# Patient Record
Sex: FEMALE | Race: WHITE | NOT HISPANIC OR LATINO
[De-identification: names, ages, dates, MRNs, and addresses within clinical notes are randomized per-mention and may not be internally consistent; named-entity substitution may affect disease eponyms.]

---

## 2019-05-04 ENCOUNTER — TRANSCRIPTION ENCOUNTER (OUTPATIENT)
Age: 24
End: 2019-05-04

## 2019-12-27 ENCOUNTER — TRANSCRIPTION ENCOUNTER (OUTPATIENT)
Age: 24
End: 2019-12-27

## 2021-04-17 ENCOUNTER — TRANSCRIPTION ENCOUNTER (OUTPATIENT)
Age: 26
End: 2021-04-17

## 2021-07-20 ENCOUNTER — TRANSCRIPTION ENCOUNTER (OUTPATIENT)
Age: 26
End: 2021-07-20

## 2021-07-26 ENCOUNTER — TRANSCRIPTION ENCOUNTER (OUTPATIENT)
Age: 26
End: 2021-07-26

## 2021-09-22 ENCOUNTER — TRANSCRIPTION ENCOUNTER (OUTPATIENT)
Age: 26
End: 2021-09-22

## 2023-01-08 ENCOUNTER — NON-APPOINTMENT (OUTPATIENT)
Age: 28
End: 2023-01-08

## 2023-01-09 PROBLEM — Z00.00 ENCOUNTER FOR PREVENTIVE HEALTH EXAMINATION: Status: ACTIVE | Noted: 2023-01-09

## 2023-01-09 PROBLEM — Z00.00 ENCOUNTER FOR PREVENTIVE HEALTH EXAMINATION: Noted: 2023-01-09

## 2023-01-11 ENCOUNTER — NON-APPOINTMENT (OUTPATIENT)
Age: 28
End: 2023-01-11

## 2023-01-11 ENCOUNTER — APPOINTMENT (OUTPATIENT)
Dept: ORTHOPEDIC SURGERY | Facility: CLINIC | Age: 28
End: 2023-01-11
Payer: COMMERCIAL

## 2023-01-11 DIAGNOSIS — S89.91XA UNSPECIFIED INJURY OF RIGHT LOWER LEG, INITIAL ENCOUNTER: ICD-10-CM

## 2023-01-11 PROCEDURE — 99204 OFFICE O/P NEW MOD 45 MIN: CPT

## 2023-01-11 PROCEDURE — 73564 X-RAY EXAM KNEE 4 OR MORE: CPT | Mod: RT

## 2023-01-13 PROBLEM — S89.91XA RIGHT KNEE INJURY: Status: ACTIVE | Noted: 2023-01-13

## 2023-01-13 NOTE — PHYSICAL EXAM
[de-identified] : Oriented to time, place, person\par Mood: Normal\par Affect: Normal\par Appearance: Healthy, well appearing, no acute distress.\par Gait: Antalgic\par Assistive Devices: knee immobilizer\par \par Right Knee Exam:\par \par Skin: Clean, dry, intact\par Inspection: No obvious malalignment, no masses, + swelling, + effusion\par Pulses: 2+ DP/PT pulses \par ROM: 0-70 degrees of flexion. No pain with deep knee flexion/extension.\par Tenderness: + MJLT. No LJLT. No pain over the patella facets. No pain to the quadriceps tendon. No pain to the patella tendon. No posterior knee tenderness.\par Stability: Stable to varus, Grade I valgus. IIB Lachman testing. Negative anterior drawer, negative posterior drawer.\par Strength: 5/5 Q/H/TA/GS/EHL, without atrophy\par Neuro: Intact to light touch throughout, DTRs normal\par Additional Tests: Negative Pavan's test, Negative patellar grind test  [de-identified] : The following radiographs were ordered and read by me during this patients visit. I reviewed each radiograph in detail with the patient and discussed the findings as highlighted below. \par \par 4 views of the right knee were obtained today, 01/11/2023, that show no acute fracture or dislocation. There is no medial, no lateral and no patellofemoral degenerative changes seen. There is no significant malalignment. No significant other obvious osseous abnormality, otherwise unremarkable.

## 2023-01-13 NOTE — DISCUSSION/SUMMARY
[de-identified] : 28 y/o female with right knee injury\par \par Patient presents for evaluation of acute right knee pain consistent with possible ligamentous injury to the knee. The patient's symptoms are consistent with possible anterior cruciate ligament pathology as well as medial collateral ligament pathology with positive gross instability testing. Patient reports mechanical symptoms of instability as well as pain. Discussed with the patient in detail the concern for underlying internal derangement to the meniscus/ligamentous complex. Discussed that treatment would likely depend on the nature of the injury as seen on MRI imaging.  Patient is agreeable to further imaging at this time.\par \par Recommendation: Bracing as provided.  Rest, ice, compression, elevation (RICE) and OTC NSAID's as instructed until MRI imaging.\par \par Follow up after MRI.

## 2023-01-13 NOTE — HISTORY OF PRESENT ILLNESS
[de-identified] : 27 year old female presents today with right knee pain since 1/7/23. [place] and she had to stop short felt a pop in her knee and she fell. She was unable to put weight on the knee when she stood up. She was seen at the [facility], x-rays were negative for fx.  The pain has improved but she is unable to bend or put weight on it. She is taking Tylenol for pain. Patient presents in a knee immobilizer. \par \par The patient's past medical history, past surgical history, medications and allergies were reviewed by me today with the patient and documented accordingly. In addition, the patient's family and social history, which were noncontributory to this visit, were reviewed also.

## 2023-01-18 ENCOUNTER — OUTPATIENT (OUTPATIENT)
Dept: OUTPATIENT SERVICES | Facility: HOSPITAL | Age: 28
LOS: 1 days | End: 2023-01-18
Payer: COMMERCIAL

## 2023-01-18 ENCOUNTER — APPOINTMENT (OUTPATIENT)
Dept: MRI IMAGING | Facility: CLINIC | Age: 28
End: 2023-01-18
Payer: COMMERCIAL

## 2023-01-18 DIAGNOSIS — Z00.00 ENCOUNTER FOR GENERAL ADULT MEDICAL EXAMINATION WITHOUT ABNORMAL FINDINGS: ICD-10-CM

## 2023-01-18 PROCEDURE — 73721 MRI JNT OF LWR EXTRE W/O DYE: CPT | Mod: 26,RT

## 2023-01-18 PROCEDURE — 73721 MRI JNT OF LWR EXTRE W/O DYE: CPT

## 2023-01-25 ENCOUNTER — NON-APPOINTMENT (OUTPATIENT)
Age: 28
End: 2023-01-25

## 2023-01-26 ENCOUNTER — TRANSCRIPTION ENCOUNTER (OUTPATIENT)
Age: 28
End: 2023-01-26

## 2023-01-26 ENCOUNTER — APPOINTMENT (OUTPATIENT)
Dept: ORTHOPEDIC SURGERY | Facility: CLINIC | Age: 28
End: 2023-01-26
Payer: COMMERCIAL

## 2023-01-26 VITALS — BODY MASS INDEX: 34.15 KG/M2 | WEIGHT: 200 LBS | HEIGHT: 64 IN

## 2023-01-26 DIAGNOSIS — S83.511D SPRAIN OF ANTERIOR CRUCIATE LIGAMENT OF RIGHT KNEE, SUBSEQUENT ENCOUNTER: ICD-10-CM

## 2023-01-26 PROCEDURE — 99214 OFFICE O/P EST MOD 30 MIN: CPT

## 2023-01-27 PROBLEM — S83.511D COMPLETE TEAR OF ANTERIOR CRUCIATE LIGAMENT OF KNEE, RIGHT, SUBSEQUENT ENCOUNTER: Status: ACTIVE | Noted: 2023-01-27

## 2023-01-27 NOTE — HISTORY OF PRESENT ILLNESS
[de-identified] : 27 year old female presents today for follow up of right knee pain since 1/7/23. MRI right knee dated 1/18/2023 shows evidence of a full-thickness tear of the anterior cruciate ligament through the mid substance. Meniscus appears intact. She presents in [place] brace  The pain has improved but she is still unable to bend much  or put much weight on it. She is taking Tylenol for pain.

## 2023-01-27 NOTE — ADDENDUM
[FreeTextEntry1] : This note was written by Andreea Mayo on 01/26/2023 acting solely as a scribe for Dr. Etienne Easton.\par \par All medical record entries made by the Scribe were at my, Dr. Etienne Easton, direction and personally dictated by me on 01/26/2023. I have personally reviewed the chart and agree that the record accurately reflects my personal performance of the history, physical exam, assessment and plan.

## 2023-01-27 NOTE — PHYSICAL EXAM
[de-identified] : Oriented to time, place, person\par Mood: Normal\par Affect: Normal\par Appearance: Healthy, well appearing, no acute distress.\par Gait: Antalgic\par Assistive Devices: knee immobilizer\par \par Right Knee Exam:\par \par Skin: Clean, dry, intact\par Inspection: No obvious malalignment, no masses, + swelling, + effusion\par Pulses: 2+ DP/PT pulses \par ROM: 0-115 degrees of flexion. + pain with deep knee flexion/extension.\par Tenderness: + MJLT. No LJLT. No pain over the patella facets. No pain to the quadriceps tendon. No pain to the patella tendon. No posterior knee tenderness.\par Stability: Stable to varus, Grade I valgus. IB Lachman testing. Negative anterior drawer, negative posterior drawer.\par Strength: 5/5 Q/H/TA/GS/EHL, without atrophy\par Neuro: Intact to light touch throughout, DTRs normal\par Additional Tests: Negative Pavan's test, Negative patellar grind test  [de-identified] : 4 views of the right knee were obtained 01/11/2023, that show no acute fracture or dislocation. There is no medial, no lateral and no patellofemoral degenerative changes seen. There is no significant malalignment. No significant other obvious osseous abnormality, otherwise unremarkable. \par \par MRI right knee dated 1/18/2023 shows evidence of a full-thickness tear of the anterior cruciate ligament through the mid substance. Meniscus appears intact.

## 2023-01-27 NOTE — DISCUSSION/SUMMARY
[de-identified] : 26 y/o female with right knee ACL tear\par \par Patient presents for evaluation of acute right knee pain consistent with ligamentous injury to the knee.  Clinical concern was for ACL injury, and MRI shows evidence of a full-thickness tear of the anterior cruciate ligament through the mid substance.   No evidence of meniscus pathology.  Given the patient's age and activity level, a discussion was had with the patient regarding ACL reconstruction.  Patient reports that she is getting  in June, and may want to postpone any surgical intervention until that time.  We discussed consideration of early surgical intervention versus delayed surgical intervention and risk involved to the the knee.\par \par All risks, benefits and alternatives to right anterior cruciate ligament reconstruction with evaluation of the meniscal and chondral surfaces were discussed in great detail with the patient. Risks include but are not limited to pain, bleeding, infection, stiffness/instability, impingement, graft re-rupture, meniscectomy versus repair, medical complications and risks of anesthesia. In addition, a discussion was had with the patient regarding anterior cruciate ligament graft options. This included the role of autograft versus allograft, and the associated donor site morbidity and rerupture rate with autograft versus risk of disease transmission/rerupture with allograft use. The patient expressed understanding and all questions were answered.  \par \par A discussion was also had with the patient regarding additional precautions during surgery given the recent Covid-19 pandemic; specifically with regards to perioperative care, visitor policy, and pre-admission testing. The patient understands that current protocol requires either documented Covid-19 vaccination or a negative Covid-19 test no more than 48hrs prior to surgery. \par \par The patient will follow-up if she has elected to proceed acutely, or in a delayed fashion.  We provided a PT prescription for PREHAB regardless.

## 2024-10-01 ENCOUNTER — NON-APPOINTMENT (OUTPATIENT)
Age: 29
End: 2024-10-01

## 2024-12-14 ENCOUNTER — NON-APPOINTMENT (OUTPATIENT)
Age: 29
End: 2024-12-14

## 2025-03-27 ENCOUNTER — NON-APPOINTMENT (OUTPATIENT)
Age: 30
End: 2025-03-27